# Patient Record
Sex: FEMALE | Race: WHITE | NOT HISPANIC OR LATINO | Employment: FULL TIME | URBAN - METROPOLITAN AREA
[De-identification: names, ages, dates, MRNs, and addresses within clinical notes are randomized per-mention and may not be internally consistent; named-entity substitution may affect disease eponyms.]

---

## 2017-03-17 ENCOUNTER — OFFICE VISIT (OUTPATIENT)
Dept: ORTHOPEDIC SURGERY | Facility: CLINIC | Age: 37
End: 2017-03-17

## 2017-03-17 VITALS — BODY MASS INDEX: 27.46 KG/M2 | WEIGHT: 155 LBS

## 2017-03-17 DIAGNOSIS — M67.911 TENDINOPATHY OF ROTATOR CUFF, RIGHT: ICD-10-CM

## 2017-03-17 DIAGNOSIS — M75.41 SUBACROMIAL IMPINGEMENT, RIGHT: ICD-10-CM

## 2017-03-17 DIAGNOSIS — R52 PAIN: Primary | ICD-10-CM

## 2017-03-17 DIAGNOSIS — S16.1XXA CERVICAL STRAIN, INITIAL ENCOUNTER: ICD-10-CM

## 2017-03-17 PROBLEM — M75.40 SUBACROMIAL IMPINGEMENT: Status: ACTIVE | Noted: 2017-03-17

## 2017-03-17 PROBLEM — M67.919 TENDINOPATHY OF ROTATOR CUFF: Status: ACTIVE | Noted: 2017-03-17

## 2017-03-17 PROCEDURE — 20610 DRAIN/INJ JOINT/BURSA W/O US: CPT | Performed by: NURSE PRACTITIONER

## 2017-03-17 PROCEDURE — 73030 X-RAY EXAM OF SHOULDER: CPT | Performed by: NURSE PRACTITIONER

## 2017-03-17 PROCEDURE — 99213 OFFICE O/P EST LOW 20 MIN: CPT | Performed by: NURSE PRACTITIONER

## 2017-03-17 RX ORDER — TRIAMCINOLONE ACETONIDE 40 MG/ML
80 INJECTION, SUSPENSION INTRA-ARTICULAR; INTRAMUSCULAR
Status: COMPLETED | OUTPATIENT
Start: 2017-03-17 | End: 2017-03-17

## 2017-03-17 RX ORDER — LIDOCAINE HYDROCHLORIDE 10 MG/ML
4 INJECTION, SOLUTION INFILTRATION; PERINEURAL
Status: COMPLETED | OUTPATIENT
Start: 2017-03-17 | End: 2017-03-17

## 2017-03-17 RX ORDER — CYCLOBENZAPRINE HCL 10 MG
10 TABLET ORAL 3 TIMES DAILY PRN
Qty: 30 TABLET | Refills: 0 | Status: SHIPPED | OUTPATIENT
Start: 2017-03-17 | End: 2017-06-12

## 2017-03-17 RX ADMIN — LIDOCAINE HYDROCHLORIDE 4 ML: 10 INJECTION, SOLUTION INFILTRATION; PERINEURAL at 11:04

## 2017-03-17 RX ADMIN — TRIAMCINOLONE ACETONIDE 80 MG: 40 INJECTION, SUSPENSION INTRA-ARTICULAR; INTRAMUSCULAR at 11:04
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
## 2017-08-20 ENCOUNTER — OFFICE VISIT (OUTPATIENT)
Dept: RETAIL CLINIC | Facility: CLINIC | Age: 37
End: 2017-08-20

## 2017-08-20 DIAGNOSIS — Z02.83 ENCOUNTER FOR DRUG SCREENING: Primary | ICD-10-CM

## 2017-08-20 PROBLEM — Z02.1 DRUG SCREENING, PRE-EMPLOYMENT: Status: ACTIVE | Noted: 2017-08-20

## 2017-11-29 ENCOUNTER — OFFICE VISIT (OUTPATIENT)
Dept: ORTHOPEDIC SURGERY | Facility: CLINIC | Age: 37
End: 2017-11-29

## 2017-11-29 DIAGNOSIS — M67.911 TENDINOPATHY OF ROTATOR CUFF, RIGHT: ICD-10-CM

## 2017-11-29 DIAGNOSIS — M54.12 CERVICAL RADICULOPATHY: Primary | ICD-10-CM

## 2017-11-29 DIAGNOSIS — M75.41 SUBACROMIAL IMPINGEMENT, RIGHT: ICD-10-CM

## 2017-11-29 PROCEDURE — 20610 DRAIN/INJ JOINT/BURSA W/O US: CPT | Performed by: NURSE PRACTITIONER

## 2017-11-29 PROCEDURE — 99213 OFFICE O/P EST LOW 20 MIN: CPT | Performed by: NURSE PRACTITIONER

## 2017-11-29 RX ORDER — TOPIRAMATE 50 MG/1
TABLET, FILM COATED ORAL
COMMUNITY
Start: 2017-11-07 | End: 2019-12-07 | Stop reason: ALTCHOICE

## 2017-11-29 RX ORDER — PROMETHAZINE HYDROCHLORIDE 12.5 MG/1
TABLET ORAL
COMMUNITY
Start: 2017-09-05 | End: 2019-12-07 | Stop reason: ALTCHOICE

## 2017-11-29 RX ORDER — SUMATRIPTAN 50 MG/1
TABLET, FILM COATED ORAL
COMMUNITY
Start: 2017-09-08 | End: 2019-12-07 | Stop reason: ALTCHOICE

## 2017-11-29 RX ORDER — ESTRADIOL 0.1 MG/G
CREAM VAGINAL
COMMUNITY
Start: 2017-09-02 | End: 2019-12-07 | Stop reason: ALTCHOICE

## 2017-11-29 RX ORDER — LEVOTHYROXINE SODIUM 0.03 MG/1
TABLET ORAL
COMMUNITY
Start: 2017-11-07

## 2017-11-29 RX ADMIN — TRIAMCINOLONE ACETONIDE 80 MG: 40 INJECTION, SUSPENSION INTRA-ARTICULAR; INTRAMUSCULAR at 15:00

## 2017-11-29 RX ADMIN — BUPIVACAINE HYDROCHLORIDE 1 ML: 5 INJECTION, SOLUTION EPIDURAL; INTRACAUDAL at 15:00

## 2017-11-29 RX ADMIN — LIDOCAINE HYDROCHLORIDE 2 ML: 10 INJECTION, SOLUTION EPIDURAL; INFILTRATION; INTRACAUDAL; PERINEURAL at 15:00

## 2017-12-04 RX ORDER — LIDOCAINE HYDROCHLORIDE 10 MG/ML
2 INJECTION, SOLUTION EPIDURAL; INFILTRATION; INTRACAUDAL; PERINEURAL
Status: COMPLETED | OUTPATIENT
Start: 2017-11-29 | End: 2017-11-29

## 2017-12-04 RX ORDER — BUPIVACAINE HYDROCHLORIDE 5 MG/ML
1 INJECTION, SOLUTION EPIDURAL; INTRACAUDAL
Status: COMPLETED | OUTPATIENT
Start: 2017-11-29 | End: 2017-11-29

## 2017-12-04 RX ORDER — TRIAMCINOLONE ACETONIDE 40 MG/ML
80 INJECTION, SUSPENSION INTRA-ARTICULAR; INTRAMUSCULAR
Status: COMPLETED | OUTPATIENT
Start: 2017-11-29 | End: 2017-11-29

## 2017-12-12 ENCOUNTER — TELEPHONE (OUTPATIENT)
Dept: ORTHOPEDIC SURGERY | Facility: CLINIC | Age: 37
End: 2017-12-12

## 2017-12-12 NOTE — TELEPHONE ENCOUNTER
"Patients request for MRI c spine has been denied by [facility] as \"not medically necessary\". THey gave a list of reason it could be; my best guess would be that the time between when shes was seen in November and again in March was too far apart. They want clinical data over 6 weeks, w/in the last 3 months and she hadn't been seen in over 6 months. I guess what we can do is see her back in 6 weeks from her 11/29 visit and re-evaluate and request the MRI again at that time?  "

## 2017-12-13 DIAGNOSIS — M54.12 CERVICAL RADICULOPATHY: Primary | ICD-10-CM

## 2017-12-13 NOTE — TELEPHONE ENCOUNTER
Yes but I would rather refer on to spine speciality to be evaluated. I will place order for [facility] spine per patient request. Thank you

## 2019-12-07 ENCOUNTER — HOSPITAL ENCOUNTER (EMERGENCY)
Facility: HOSPITAL | Age: 39
Discharge: HOME | End: 2019-12-07
Attending: EMERGENCY MEDICINE | Admitting: EMERGENCY MEDICINE

## 2019-12-07 ENCOUNTER — APPOINTMENT (OUTPATIENT)
Dept: CT IMAGING | Facility: HOSPITAL | Age: 39
End: 2019-12-07

## 2019-12-07 VITALS
RESPIRATION RATE: 16 BRPM | SYSTOLIC BLOOD PRESSURE: 127 MMHG | TEMPERATURE: 98.1 F | DIASTOLIC BLOOD PRESSURE: 76 MMHG | HEART RATE: 72 BPM | WEIGHT: 169.44 LBS | OXYGEN SATURATION: 98 % | HEIGHT: 63 IN | BODY MASS INDEX: 30.02 KG/M2

## 2019-12-07 DIAGNOSIS — N83.202 CYST OF LEFT OVARY: Primary | ICD-10-CM

## 2019-12-07 DIAGNOSIS — R10.30 LOWER ABDOMINAL PAIN: ICD-10-CM

## 2019-12-07 LAB
ALBUMIN SERPL-MCNC: 4.5 G/DL (ref 3.5–5.2)
ALBUMIN/GLOB SERPL: 1.5 G/DL
ALP SERPL-CCNC: 92 U/L (ref 39–117)
ALT SERPL W P-5'-P-CCNC: 22 U/L (ref 1–33)
ANION GAP SERPL CALCULATED.3IONS-SCNC: 16 MMOL/L (ref 5–15)
AST SERPL-CCNC: 17 U/L (ref 1–32)
BACTERIA UR QL AUTO: ABNORMAL /HPF
BASOPHILS # BLD AUTO: 0.06 10*3/MM3 (ref 0–0.2)
BASOPHILS NFR BLD AUTO: 0.6 % (ref 0–1.5)
BILIRUB SERPL-MCNC: 0.2 MG/DL (ref 0.2–1.2)
BILIRUB UR QL STRIP: NEGATIVE
BUN BLD-MCNC: 12 MG/DL (ref 6–20)
BUN/CREAT SERPL: 16.4 (ref 7–25)
CALCIUM SPEC-SCNC: 9.1 MG/DL (ref 8.6–10.5)
CHLORIDE SERPL-SCNC: 101 MMOL/L (ref 98–107)
CLARITY UR: CLEAR
CO2 SERPL-SCNC: 19 MMOL/L (ref 22–29)
COLOR UR: YELLOW
CREAT BLD-MCNC: 0.73 MG/DL (ref 0.57–1)
DEPRECATED RDW RBC AUTO: 38.5 FL (ref 37–54)
EOSINOPHIL # BLD AUTO: 0.26 10*3/MM3 (ref 0–0.4)
EOSINOPHIL NFR BLD AUTO: 2.6 % (ref 0.3–6.2)
ERYTHROCYTE [DISTWIDTH] IN BLOOD BY AUTOMATED COUNT: 11.8 % (ref 12.3–15.4)
GFR SERPL CREATININE-BSD FRML MDRD: 89 ML/MIN/1.73
GLOBULIN UR ELPH-MCNC: 3.1 GM/DL
GLUCOSE BLD-MCNC: 108 MG/DL (ref 65–99)
GLUCOSE UR STRIP-MCNC: NEGATIVE MG/DL
HCT VFR BLD AUTO: 42 % (ref 34–46.6)
HGB BLD-MCNC: 14.4 G/DL (ref 12–15.9)
HGB UR QL STRIP.AUTO: ABNORMAL
HYALINE CASTS UR QL AUTO: ABNORMAL /LPF
IMM GRANULOCYTES # BLD AUTO: 0.06 10*3/MM3 (ref 0–0.05)
IMM GRANULOCYTES NFR BLD AUTO: 0.6 % (ref 0–0.5)
KETONES UR QL STRIP: NEGATIVE
LEUKOCYTE ESTERASE UR QL STRIP.AUTO: NEGATIVE
LYMPHOCYTES # BLD AUTO: 2.29 10*3/MM3 (ref 0.7–3.1)
LYMPHOCYTES NFR BLD AUTO: 22.7 % (ref 19.6–45.3)
MCH RBC QN AUTO: 31 PG (ref 26.6–33)
MCHC RBC AUTO-ENTMCNC: 34.3 G/DL (ref 31.5–35.7)
MCV RBC AUTO: 90.5 FL (ref 79–97)
MONOCYTES # BLD AUTO: 0.66 10*3/MM3 (ref 0.1–0.9)
MONOCYTES NFR BLD AUTO: 6.5 % (ref 5–12)
NEUTROPHILS # BLD AUTO: 6.75 10*3/MM3 (ref 1.7–7)
NEUTROPHILS NFR BLD AUTO: 67 % (ref 42.7–76)
NITRITE UR QL STRIP: NEGATIVE
NRBC BLD AUTO-RTO: 0 /100 WBC (ref 0–0.2)
PH UR STRIP.AUTO: 6.5 [PH] (ref 4.5–8)
PLATELET # BLD AUTO: 257 10*3/MM3 (ref 140–450)
PMV BLD AUTO: 9.9 FL (ref 6–12)
POTASSIUM BLD-SCNC: 4.4 MMOL/L (ref 3.5–5.2)
PROT SERPL-MCNC: 7.6 G/DL (ref 6–8.5)
PROT UR QL STRIP: NEGATIVE
RBC # BLD AUTO: 4.64 10*6/MM3 (ref 3.77–5.28)
RBC # UR: ABNORMAL /HPF
REF LAB TEST METHOD: ABNORMAL
SODIUM BLD-SCNC: 136 MMOL/L (ref 136–145)
SP GR UR STRIP: 1.01 (ref 1–1.03)
SQUAMOUS #/AREA URNS HPF: ABNORMAL /HPF
UROBILINOGEN UR QL STRIP: ABNORMAL
WBC NRBC COR # BLD: 10.08 10*3/MM3 (ref 3.4–10.8)
WBC UR QL AUTO: ABNORMAL /HPF

## 2019-12-07 PROCEDURE — 99282 EMERGENCY DEPT VISIT SF MDM: CPT | Performed by: EMERGENCY MEDICINE

## 2019-12-07 PROCEDURE — 96374 THER/PROPH/DIAG INJ IV PUSH: CPT

## 2019-12-07 PROCEDURE — 99282 EMERGENCY DEPT VISIT SF MDM: CPT

## 2019-12-07 PROCEDURE — 25010000002 KETOROLAC TROMETHAMINE PER 15 MG: Performed by: EMERGENCY MEDICINE

## 2019-12-07 PROCEDURE — 80053 COMPREHEN METABOLIC PANEL: CPT | Performed by: EMERGENCY MEDICINE

## 2019-12-07 PROCEDURE — 81001 URINALYSIS AUTO W/SCOPE: CPT | Performed by: EMERGENCY MEDICINE

## 2019-12-07 PROCEDURE — 74176 CT ABD & PELVIS W/O CONTRAST: CPT

## 2019-12-07 PROCEDURE — 85025 COMPLETE CBC W/AUTO DIFF WBC: CPT | Performed by: EMERGENCY MEDICINE

## 2019-12-07 PROCEDURE — 99283 EMERGENCY DEPT VISIT LOW MDM: CPT

## 2019-12-07 RX ORDER — IBUPROFEN 800 MG/1
800 TABLET ORAL EVERY 8 HOURS PRN
Qty: 15 TABLET | Refills: 0 | OUTPATIENT
Start: 2019-12-07 | End: 2023-03-08

## 2019-12-07 RX ORDER — KETOROLAC TROMETHAMINE 30 MG/ML
15 INJECTION, SOLUTION INTRAMUSCULAR; INTRAVENOUS ONCE
Status: COMPLETED | OUTPATIENT
Start: 2019-12-07 | End: 2019-12-07

## 2019-12-07 RX ORDER — CYCLOBENZAPRINE HCL 10 MG
10 TABLET ORAL 3 TIMES DAILY
Qty: 15 TABLET | Refills: 0 | OUTPATIENT
Start: 2019-12-07 | End: 2023-03-08

## 2019-12-07 RX ADMIN — KETOROLAC TROMETHAMINE 15 MG: 30 INJECTION, SOLUTION INTRAMUSCULAR; INTRAVENOUS at 08:19

## 2019-12-07 NOTE — ED PROVIDER NOTES
Subjective     Flank Pain   Pain location:  L flank  Pain quality: sharp    Pain radiates to:  Does not radiate  Pain severity:  Moderate  Onset quality:  Gradual  Timing:  Constant  Progression:  Unchanged  Chronicity:  New  Context comment:  Spont onset  Relieved by:  Nothing  Worsened by:  Position changes  Associated symptoms: no constipation, no dysuria and no fever        Review of Systems   Constitutional: Negative for fever.   Gastrointestinal: Negative for constipation.   Genitourinary: Positive for flank pain. Negative for dysuria.   All other systems reviewed and are negative.      Past Medical History:   Diagnosis Date   • Anxiety    • Headache    • Pelvic pain in female    • Stress incontinence in female        No Known Allergies    Past Surgical History:   Procedure Laterality Date   • DENTAL PROCEDURE     • DIAGNOSTIC LAPAROSCOPY N/A 8/22/2016    Procedure: EXAM UNDER ANESTHESIA WITH VAGINAL CUFF SUTURE AND CLOSURE OF TVT INCISION;  Surgeon: Jaylyn Amin MD;  Location: [facility];  Service:    • EXPLORATORY LAPAROTOMY      RUPTURED TUBAL PREGNANCY   • HYSTERECTOMY     • OTHER SURGICAL HISTORY      laparoscopic endometriosis   • [place] LAP, RADICAL HYST W/ TUBE&OV, NODE BX N/A 8/9/2016    Procedure: TOTAL LAPAROSCOPIC HYSTERECTOMY, LT SALPINGECTOMY, CYSTOSCOPY;  Surgeon: Jaylyn Amin MD;  Location: [facility];  Service: Gynecology   • TONSILLECTOMY     • TRANSVAGINAL TAPING SUSPENSION N/A 8/9/2016    Procedure: TRANSVAGINAL TAPING SUSPENSION;  Surgeon: Jaylyn Amin MD;  Location: [facility];  Service:    • TUBAL ABDOMINAL LIGATION         Family History   Problem Relation Age of Onset   • Hypertension Mother    • Cancer Maternal Grandmother    • Hypertension Maternal Grandmother    • Diabetes Father        Social History     Socioeconomic History   • Marital status: Single     Spouse name: Not on file   • Number of children: Not on file   • Years of education: Not on file   • Highest  education level: Not on file   Tobacco Use   • Smoking status: Current Every Day Smoker     Packs/day: 1.00     Types: Cigarettes   • Smokeless tobacco: Never Used   Substance and Sexual Activity   • Alcohol use: Yes     Comment: occ   • Drug use: No   • Sexual activity: Defer           Objective   Physical Exam   Constitutional: She appears well-developed and well-nourished.   Abdominal: Soft. Normal appearance. There is no tenderness. There is no rigidity, no rebound, no guarding, no tenderness at McBurney's point and negative Wilson's sign. No hernia.   Neurological: She is alert. She has normal strength. No cranial nerve deficit or sensory deficit. Coordination and gait normal.   Skin: Skin is warm. Capillary refill takes less than 2 seconds. No rash noted.   Psychiatric: She has a normal mood and affect.   Nursing note and vitals reviewed.      Procedures           ED Course      Reeval, appears well, abd soft, ok with plan to f/u gyn                No data recorded                        MDM  Number of Diagnoses or Management Options  Cyst of left ovary:   Lower abdominal pain:      Amount and/or Complexity of Data Reviewed  Clinical lab tests: ordered and reviewed  Tests in the radiology section of CPT®: ordered and reviewed    Risk of Complications, Morbidity, and/or Mortality  Presenting problems: moderate  Diagnostic procedures: moderate  Management options: moderate    Patient Progress  Patient progress: stable      Final diagnoses:   Cyst of left ovary   Lower abdominal pain              Micha Ng MD  12/07/19 0995

## 2023-03-08 ENCOUNTER — HOSPITAL ENCOUNTER (EMERGENCY)
Facility: HOSPITAL | Age: 43
Discharge: HOME | End: 2023-03-08
Attending: EMERGENCY MEDICINE | Admitting: EMERGENCY MEDICINE
Payer: COMMERCIAL

## 2023-03-08 VITALS
DIASTOLIC BLOOD PRESSURE: 70 MMHG | WEIGHT: 165 LBS | HEART RATE: 79 BPM | BODY MASS INDEX: 29.23 KG/M2 | HEIGHT: 63 IN | SYSTOLIC BLOOD PRESSURE: 152 MMHG | RESPIRATION RATE: 18 BRPM | OXYGEN SATURATION: 99 % | TEMPERATURE: 98.1 F

## 2023-03-08 DIAGNOSIS — F41.9 ANXIETY: Primary | ICD-10-CM

## 2023-03-08 PROCEDURE — 99282 EMERGENCY DEPT VISIT SF MDM: CPT

## 2023-03-09 NOTE — ED NOTES
Patient is unsure of what meds she was most recently taking. Nurse showed her a list of what her PCP had listed. She thinks duloxetine, topiramate, and celexa were the ones she was on that were abruptly stopped. Dr. Vargas updated.

## 2023-03-09 NOTE — ED PROVIDER NOTES
Subjective   History of Present Illness  42-year-old female presents complaining of worsening anxiety, headaches, inability to sleep since stopping multiple medications cold turkey about 3 weeks ago.  Per patient, she recently began seeing Dr. Moralez for primary care at which time she found out she had elevated liver enzymes.  He had plan to taper her off of multiple psychiatric medications including Topamax, Celexa, Abilify, and start her on Vraylar.  Instead of tapering off these medication she quit all of them cold turkey and is only been taking her levothyroxine since that time.  She has had issues with pharmacy having Vraylar in stock so she has not yet started this medication but is scheduled to pick it up tomorrow.  She presents tonight with the above complaints.  She denies SI, HI, hallucinations.  She has diagnosis of bipolar listed on her chart.  She cannot tell me specifically what her diagnoses are however she denies any history of psychotic or manic episodes.        Review of Systems   All other systems reviewed and are negative.      Past Medical History:   Diagnosis Date   • Anxiety    • Headache    • Pelvic pain in female    • Stress incontinence in female        No Known Allergies    Past Surgical History:   Procedure Laterality Date   • DENTAL PROCEDURE     • DIAGNOSTIC LAPAROSCOPY N/A 8/22/2016    Procedure: EXAM UNDER ANESTHESIA WITH VAGINAL CUFF SUTURE AND CLOSURE OF TVT INCISION;  Surgeon: Jaylyn Amin MD;  Location: [facility];  Service:    • EXPLORATORY LAPAROTOMY      RUPTURED TUBAL PREGNANCY   • HYSTERECTOMY     • OTHER SURGICAL HISTORY      laparoscopic endometriosis   • [place] LAPS W/RAD HYST W/BILAT LMPHADEC RMVL TUBE/OVARY N/A 8/9/2016    Procedure: TOTAL LAPAROSCOPIC HYSTERECTOMY, LT SALPINGECTOMY, CYSTOSCOPY;  Surgeon: Jaylyn Amin MD;  Location: [facility];  Service: Gynecology   • TONSILLECTOMY     • TRANSVAGINAL TAPING SUSPENSION N/A 8/9/2016    Procedure: TRANSVAGINAL TAPING  SUSPENSION;  Surgeon: Jaylyn Amin MD;  Location: [facility] OR;  Service:    • TUBAL ABDOMINAL LIGATION         Family History   Problem Relation Age of Onset   • Hypertension Mother    • Cancer Maternal Grandmother    • Hypertension Maternal Grandmother    • Diabetes Father        Social History     Socioeconomic History   • Marital status: Single   Tobacco Use   • Smoking status: Every Day     Packs/day: 1.00     Types: Cigarettes   • Smokeless tobacco: Never   Substance and Sexual Activity   • Alcohol use: Yes     Comment: occ   • Drug use: No   • Sexual activity: Defer           Objective   Physical Exam  Constitutional:       General: She is not in acute distress.     Appearance: She is not toxic-appearing.   HENT:      Head: Normocephalic and atraumatic.      Mouth/Throat:      Mouth: Mucous membranes are moist.      Pharynx: Oropharynx is clear.   Eyes:      Extraocular Movements: Extraocular movements intact.      Conjunctiva/sclera: Conjunctivae normal.      Pupils: Pupils are equal, round, and reactive to light.   Cardiovascular:      Rate and Rhythm: Normal rate and regular rhythm.   Pulmonary:      Effort: Pulmonary effort is normal. No respiratory distress.   Musculoskeletal:         General: No deformity or signs of injury.      Cervical back: Normal range of motion and neck supple.   Skin:     General: Skin is warm and dry.   Neurological:      General: No focal deficit present.      Mental Status: She is alert and oriented to person, place, and time. Mental status is at baseline.   Psychiatric:         Mood and Affect: Mood normal.         Behavior: Behavior normal.         Thought Content: Thought content normal.         Judgment: Judgment normal.         Procedures           ED Course  ED Course as of 03/08/23 2352   Wed Mar 08, 2023   2350 At this point patient has no indication for emergent psychiatric intervention or further work-up in the emergency department tonight.  She should long since  be chemically detoxed from the medication she discontinued abruptly and at this point I suspect we are dealing mostly with patient's original underlying diagnoses.  She has been prescribed appropriate medication for this and is scheduled to pick it up tomorrow.  She has primary care follow-up scheduled for next week.  Sounds as if her situation is being appropriately managed as an outpatient and I do not think there is much to gain by me trying to intervene on this plan.  I discussed this at length with patient and family at bedside and ultimately they are in agreement with this plan and comfortable with discharge. [TD]      ED Course User Index  [TD] Orlin Vargas MD                                           [facility]    Final diagnoses:   Anxiety       ED Disposition  ED Disposition     ED Disposition   Discharge    Condition   Stable    Comment   --             Prabhakar Moralez MD  [address] [place] [id number] [phone]    In 1 week  Keep follow up appointment next week         Medication List      Stop    cyclobenzaprine 10 MG tablet  Commonly known as: FLEXERIL     DULoxetine 60 MG capsule  Commonly known as: CYMBALTA     ibuprofen 800 MG tablet  Commonly known as: ADVIL,MOTRIN     multivitamin with minerals tablet tablet     NON FORMULARY     NON FORMULARY             Orlin Vargas MD  03/08/23 7489

## 2023-03-09 NOTE — ED NOTES
Pt has been off her meds for 3 weeks. She was supposed to taper them but quit taking them. Explained that she is likely not withdrawing at this time but the reason she is supposed to be on them is what she is experiencing now. Told her mom to make sure she calls Dr. Moralez tomorrow to let him know of these symptoms and that she has not started the Vraylar, which was prescribed Monday, since the pharmacy has not had it in stock. Mom agrees to do so.

## 2023-03-22 ENCOUNTER — OFFICE VISIT (OUTPATIENT)
Dept: ORTHOPEDIC SURGERY | Facility: CLINIC | Age: 43
End: 2023-03-22
Payer: COMMERCIAL

## 2023-03-22 VITALS
HEART RATE: 83 BPM | SYSTOLIC BLOOD PRESSURE: 113 MMHG | WEIGHT: 179.2 LBS | HEIGHT: 62 IN | BODY MASS INDEX: 32.97 KG/M2 | DIASTOLIC BLOOD PRESSURE: 76 MMHG

## 2023-03-22 DIAGNOSIS — R52 PAIN: Primary | ICD-10-CM

## 2023-03-22 DIAGNOSIS — M75.51 SUBACROMIAL BURSITIS OF RIGHT SHOULDER JOINT: ICD-10-CM

## 2023-03-22 DIAGNOSIS — M75.21 BICEPS TENDINITIS OF RIGHT UPPER EXTREMITY: ICD-10-CM

## 2023-03-22 DIAGNOSIS — S46.811A TRAPEZIUS MUSCLE STRAIN, RIGHT, INITIAL ENCOUNTER: ICD-10-CM

## 2023-03-22 PROCEDURE — 73030 X-RAY EXAM OF SHOULDER: CPT | Performed by: NURSE PRACTITIONER

## 2023-03-22 PROCEDURE — 99204 OFFICE O/P NEW MOD 45 MIN: CPT | Performed by: NURSE PRACTITIONER

## 2023-03-22 RX ORDER — PREDNISONE 10 MG/1
TABLET ORAL
Qty: 27 TABLET | Refills: 0 | Status: SHIPPED | OUTPATIENT
Start: 2023-03-22

## 2023-03-22 RX ORDER — TIZANIDINE 4 MG/1
4 TABLET ORAL NIGHTLY PRN
Qty: 30 TABLET | Refills: 0 | Status: SHIPPED | OUTPATIENT
Start: 2023-03-22

## 2023-03-22 RX ORDER — DICLOFENAC SODIUM 75 MG/1
75 TABLET, DELAYED RELEASE ORAL 2 TIMES DAILY
Qty: 60 TABLET | Refills: 2 | Status: SHIPPED | OUTPATIENT
Start: 2023-03-22

## 2023-03-22 NOTE — PROGRESS NOTES
Subjective:     Patient ID: Roberta Deshpande is a 42 y.o. female.    Chief Complaint:  Right shoulder pain, new patient to examiner  History of Present Illness  Roberta Deshpande 42-year-old female presents to clinic for evaluation of her right upper extremity.  She was seen greater than 3 years ago from the right shoulder denies any recent x-ray, MRI, CT of the shoulder in the past.  Pain present off and on for the last few years localizes pain to the anterior and lateral aspect of the shoulder which does radiate into the neck as well as paresthesia down to the fifth and fourth digit of the right upper extremity.  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  Rates discomfort an 8-9 out of a 10 aching, shooting, stabbing in nature.  She is experiencing a clicking popping sensation across the anterior aspect of the shoulder.  Working makes symptoms worse but is experiencing pain also with sleeping.  She is experiencing paresthesia symptoms down the scapular spine as well.  Has tried Aleve, ibuprofen without any significant symptom relief currently applying heat.  Denies any other concerns present.    Social History     Occupational History   • Not on file   Tobacco Use   • Smoking status: Every Day     Packs/day: 1.00     Types: Cigarettes   • Smokeless tobacco: Never   Substance and Sexual Activity   • Alcohol use: Not Currently     Comment: occ   • Drug use: No   • Sexual activity: Defer      Past Medical History:   Diagnosis Date   • Anxiety    • Headache    • Pelvic pain in female    • Stress incontinence in female      Past Surgical History:   Procedure Laterality Date   • DENTAL PROCEDURE     • DIAGNOSTIC LAPAROSCOPY N/A 8/22/2016    Procedure: EXAM UNDER ANESTHESIA WITH VAGINAL CUFF SUTURE AND CLOSURE OF TVT INCISION;  Surgeon: Jaylyn Amin MD;  Location: [facility];  Service:    • EXPLORATORY LAPAROTOMY      RUPTURED  "TUBAL PREGNANCY   • HYSTERECTOMY     • OTHER SURGICAL HISTORY      laparoscopic endometriosis   • [place] LAPS W/RAD HYST W/BILAT LMPHADEC RMVL TUBE/OVARY N/A 8/9/2016    Procedure: TOTAL LAPAROSCOPIC HYSTERECTOMY, LT SALPINGECTOMY, CYSTOSCOPY;  Surgeon: Jaylyn Amin MD;  Location: [facility];  Service: Gynecology   • TONSILLECTOMY     • TRANSVAGINAL TAPING SUSPENSION N/A 8/9/2016    Procedure: TRANSVAGINAL TAPING SUSPENSION;  Surgeon: Jaylyn Amin MD;  Location: [facility];  Service:    • TUBAL ABDOMINAL LIGATION         Family History   Problem Relation Age of Onset   • Hypertension Mother    • Cancer Maternal Grandmother    • Hypertension Maternal Grandmother    • Diabetes Father                Objective:  Physical Exam    Vital signs reviewed.   General: No acute distress.  Eyes: conjunctiva clear; pupils equally round and reactive  ENT: external ears and nose atraumatic; oropharynx clear  CV: no peripheral edema  Resp: normal respiratory effort  Skin: no rashes or wounds; normal turgor  Psych: mood and affect appropriate; recent and remote memory intact    Vitals:    03/22/23 0941   BP: 113/76   Pulse: 83   Weight: 81.3 kg (179 lb 3.2 oz)   Height: 157.5 cm (62\")         03/22/23  0941   Weight: 81.3 kg (179 lb 3.2 oz)     Body mass index is 32.78 kg/m².      Back Exam     Tenderness   The patient is experiencing tenderness in the cervical.    Comments:  Positive paraspinal tenderness bilaterally      Right Elbow Exam     Range of Motion   Extension: 0   Flexion: 120     Tests   Tinel's sign (cubital tunnel): positive    Other   Erythema: absent  Sensation: normal  Pulse: present      Right Shoulder Exam     Tenderness   The patient is experiencing tenderness in the acromion and biceps tendon.    Range of Motion   External rotation: 70   Forward flexion: 170   Internal rotation 0 degrees: L1     Tests   Kidd test: positive  Impingement: positive  Drop arm: negative    Other   Erythema: " absent  Sensation: normal  Pulse: present    Comments:  Internal/external rotation strength 4+ out of 5  Supraspinatus strength 4+ out of 5  Subscapularis strength 4+5  Bicep strength 4+ out of 5  Negative Hornblower's exam  negative empty can  negative Butts's  positive Speed's  negative bear hug exam               Imaging:  Right Shoulder X-Ray  Indication: Pain  AP Internal and External Rotation views    Findings:  No fracture  No bony lesion  Normal soft tissues  Normal joint spaces    prior studies were available for comparison.    Assessment:        1. Pain    2. Biceps tendinitis of right upper extremity    3. Subacromial bursitis of right shoulder joint    4. Trapezius muscle strain, right, initial encounter           Plan:  1. Discussed plan of care with patient.  Discontinue over-the-counter anti-inflammatory we will start prednisone taper as well as diclofenac after completion of the prednisone taper.  We did review and discuss exercises for her to start for the shoulder as well as the neck.  I do recommend continue with application of heat of the neck.  Plan to see her back in clinic in 4 weeks to reevaluate.  We will start Zanaflex at night as needed may cut dosage in half to decrease the drowsiness affect.  Encouraged to call with any question concerns she has between now and follow-up.  All questions answered.  Orders:  Orders Placed This Encounter   Procedures   • XR Shoulder 2+ View Right     New Medications Ordered This Visit   Medications   • diclofenac (VOLTAREN) 75 MG EC tablet     Sig: Take 1 tablet by mouth 2 (Two) Times a Day.     Dispense:  60 tablet     Refill:  2   • predniSONE (DELTASONE) 10 MG tablet     Si tabs daily x 3 days, 3 tabs daily x 3 days then 1 tab daily x 3 days     Dispense:  27 tablet     Refill:  0   • tiZANidine (ZANAFLEX) 4 MG tablet     Sig: Take 1 tablet by mouth At Night As Needed for Muscle Spasms.     Dispense:  30 tablet     Refill:  0           I ordered  and reviewed the STALIN today.       Dragon dictation utilized  We encourage all our patients to maintain a healthy weight - a Body Mass Index of 20-25. This, along with regular exercise and a balanced diet can lower your risk of many illnesses such as diabetes, heart disease, and stroke.

## 2024-10-30 ENCOUNTER — APPOINTMENT (OUTPATIENT)
Dept: GENERAL RADIOLOGY | Facility: HOSPITAL | Age: 44
End: 2024-10-30
Payer: COMMERCIAL

## 2024-10-30 ENCOUNTER — HOSPITAL ENCOUNTER (EMERGENCY)
Facility: HOSPITAL | Age: 44
Discharge: HOME | End: 2024-10-30
Attending: EMERGENCY MEDICINE | Admitting: EMERGENCY MEDICINE
Payer: COMMERCIAL

## 2024-10-30 VITALS
BODY MASS INDEX: 33.27 KG/M2 | OXYGEN SATURATION: 99 % | DIASTOLIC BLOOD PRESSURE: 109 MMHG | WEIGHT: 180.8 LBS | TEMPERATURE: 98.5 F | SYSTOLIC BLOOD PRESSURE: 131 MMHG | HEIGHT: 62 IN | HEART RATE: 75 BPM | RESPIRATION RATE: 16 BRPM

## 2024-10-30 DIAGNOSIS — R07.89 MUSCULOSKELETAL CHEST PAIN: Primary | ICD-10-CM

## 2024-10-30 LAB
ALBUMIN SERPL-MCNC: 5.1 G/DL (ref 3.5–5.2)
ALBUMIN/GLOB SERPL: 1.8 G/DL
ALP SERPL-CCNC: 67 U/L (ref 39–117)
ALT SERPL W P-5'-P-CCNC: 55 U/L (ref 1–33)
ANION GAP SERPL CALCULATED.3IONS-SCNC: 12 MMOL/L (ref 5–15)
AST SERPL-CCNC: 40 U/L (ref 1–32)
BASOPHILS # BLD AUTO: 0.14 10*3/MM3 (ref 0–0.2)
BASOPHILS NFR BLD AUTO: 1.4 % (ref 0–1.5)
BILIRUB SERPL-MCNC: 0.4 MG/DL (ref 0–1.2)
BUN SERPL-MCNC: 11 MG/DL (ref 6–20)
BUN/CREAT SERPL: 13.3 (ref 7–25)
CALCIUM SPEC-SCNC: 10 MG/DL (ref 8.6–10.5)
CHLORIDE SERPL-SCNC: 102 MMOL/L (ref 98–107)
CO2 SERPL-SCNC: 25 MMOL/L (ref 22–29)
CREAT SERPL-MCNC: 0.83 MG/DL (ref 0.57–1)
D DIMER PPP FEU-MCNC: <0.27 MCGFEU/ML (ref 0–0.5)
DEPRECATED RDW RBC AUTO: 42.5 FL (ref 37–54)
EGFRCR SERPLBLD CKD-EPI 2021: 89.3 ML/MIN/1.73
EOSINOPHIL # BLD AUTO: 0.35 10*3/MM3 (ref 0–0.4)
EOSINOPHIL NFR BLD AUTO: 3.5 % (ref 0.3–6.2)
ERYTHROCYTE [DISTWIDTH] IN BLOOD BY AUTOMATED COUNT: 13.2 % (ref 12.3–15.4)
GEN 5 2HR TROPONIN T REFLEX: <6 NG/L
GLOBULIN UR ELPH-MCNC: 2.8 GM/DL
GLUCOSE SERPL-MCNC: 94 MG/DL (ref 65–99)
HCT VFR BLD AUTO: 44.7 % (ref 34–46.6)
HGB BLD-MCNC: 15.5 G/DL (ref 12–15.9)
HOLD SPECIMEN: NORMAL
HOLD SPECIMEN: NORMAL
IMM GRANULOCYTES # BLD AUTO: 0.22 10*3/MM3 (ref 0–0.05)
IMM GRANULOCYTES NFR BLD AUTO: 2.2 % (ref 0–0.5)
LYMPHOCYTES # BLD AUTO: 2.51 10*3/MM3 (ref 0.7–3.1)
LYMPHOCYTES NFR BLD AUTO: 25.3 % (ref 19.6–45.3)
MCH RBC QN AUTO: 31.1 PG (ref 26.6–33)
MCHC RBC AUTO-ENTMCNC: 34.7 G/DL (ref 31.5–35.7)
MCV RBC AUTO: 89.8 FL (ref 79–97)
MONOCYTES # BLD AUTO: 0.36 10*3/MM3 (ref 0.1–0.9)
MONOCYTES NFR BLD AUTO: 3.6 % (ref 5–12)
NEUTROPHILS NFR BLD AUTO: 6.34 10*3/MM3 (ref 1.7–7)
NEUTROPHILS NFR BLD AUTO: 64 % (ref 42.7–76)
NRBC BLD AUTO-RTO: 0 /100 WBC (ref 0–0.2)
NT-PROBNP SERPL-MCNC: <36 PG/ML (ref 0–450)
PLAT MORPH BLD: NORMAL
PLATELET # BLD AUTO: 224 10*3/MM3 (ref 140–450)
PMV BLD AUTO: 11.6 FL (ref 6–12)
POTASSIUM SERPL-SCNC: 4.1 MMOL/L (ref 3.5–5.2)
PROT SERPL-MCNC: 7.9 G/DL (ref 6–8.5)
QT INTERVAL: 367 MS
QTC INTERVAL: 400 MS
RBC # BLD AUTO: 4.98 10*6/MM3 (ref 3.77–5.28)
RBC MORPH BLD: NORMAL
SODIUM SERPL-SCNC: 139 MMOL/L (ref 136–145)
TROPONIN T DELTA: NORMAL
TROPONIN T SERPL HS-MCNC: <6 NG/L
WBC MORPH BLD: NORMAL
WBC NRBC COR # BLD AUTO: 9.92 10*3/MM3 (ref 3.4–10.8)
WHOLE BLOOD HOLD COAG: NORMAL
WHOLE BLOOD HOLD SPECIMEN: NORMAL

## 2024-10-30 PROCEDURE — 25010000002 KETOROLAC TROMETHAMINE PER 15 MG: Performed by: EMERGENCY MEDICINE

## 2024-10-30 PROCEDURE — 85007 BL SMEAR W/DIFF WBC COUNT: CPT | Performed by: EMERGENCY MEDICINE

## 2024-10-30 PROCEDURE — 96374 THER/PROPH/DIAG INJ IV PUSH: CPT

## 2024-10-30 PROCEDURE — 93005 ELECTROCARDIOGRAM TRACING: CPT | Performed by: EMERGENCY MEDICINE

## 2024-10-30 PROCEDURE — 99284 EMERGENCY DEPT VISIT MOD MDM: CPT | Performed by: EMERGENCY MEDICINE

## 2024-10-30 PROCEDURE — 85025 COMPLETE CBC W/AUTO DIFF WBC: CPT | Performed by: EMERGENCY MEDICINE

## 2024-10-30 PROCEDURE — 93010 ELECTROCARDIOGRAM REPORT: CPT | Performed by: INTERNAL MEDICINE

## 2024-10-30 PROCEDURE — 80053 COMPREHEN METABOLIC PANEL: CPT | Performed by: EMERGENCY MEDICINE

## 2024-10-30 PROCEDURE — 85379 FIBRIN DEGRADATION QUANT: CPT | Performed by: EMERGENCY MEDICINE

## 2024-10-30 PROCEDURE — 71046 X-RAY EXAM CHEST 2 VIEWS: CPT

## 2024-10-30 PROCEDURE — 84484 ASSAY OF TROPONIN QUANT: CPT | Performed by: EMERGENCY MEDICINE

## 2024-10-30 PROCEDURE — 83880 ASSAY OF NATRIURETIC PEPTIDE: CPT | Performed by: EMERGENCY MEDICINE

## 2024-10-30 PROCEDURE — 36415 COLL VENOUS BLD VENIPUNCTURE: CPT

## 2024-10-30 RX ORDER — SODIUM CHLORIDE 0.9 % (FLUSH) 0.9 %
10 SYRINGE (ML) INJECTION AS NEEDED
Status: DISCONTINUED | OUTPATIENT
Start: 2024-10-30 | End: 2024-10-30 | Stop reason: HOSPADM

## 2024-10-30 RX ORDER — KETOROLAC TROMETHAMINE 10 MG/1
10 TABLET, FILM COATED ORAL EVERY 6 HOURS PRN
Qty: 20 TABLET | Refills: 0 | Status: SHIPPED | OUTPATIENT
Start: 2024-10-30

## 2024-10-30 RX ORDER — KETOROLAC TROMETHAMINE 30 MG/ML
15 INJECTION, SOLUTION INTRAMUSCULAR; INTRAVENOUS ONCE
Status: COMPLETED | OUTPATIENT
Start: 2024-10-30 | End: 2024-10-30

## 2024-10-30 RX ADMIN — KETOROLAC TROMETHAMINE 15 MG: 30 INJECTION, SOLUTION INTRAMUSCULAR; INTRAVENOUS at 12:53

## 2024-10-30 NOTE — ED PROVIDER NOTES
Subjective   History of Present Illness    Chief complaint: Chest pain    Location: Anterior chest, radiating to the right shoulder    Quality/Severity: Pressure, 9/10 at its worst, 7/10 currently    Timing/Onset/Duration: Started 3 days ago, constant    Modifying Factors: Patient took Tums without relief.  It hurts to move.    Associated Symptoms: No headache.  No fever chills or cough.  No sore throat earache or nasal congestion.  No shortness of breath.  No nausea or vomiting.  Patient did get a little diaphoretic.  The diaphoresis occurred last night.  The patient does relate that she had a nosebleed the first night of her chest pain.    Narrative: This 44-year-old white female presents with chest pain that radiates to left shoulder blade.  This has been present for the last 3 days.  The patient denies any trauma.  The patient smokes.  She is not diabetic.  She has never had an MI.  There is no family history of coronary artery disease.  The patient does have elevated cholesterol.  The patient has never had a stress test or cardiac catheterization.  The patient's never had a blood clot in her leg or lung.  No recent long trips or surgeries.  No bleeding or clotting problems.  The patient does not have cancer and is not on hormone therapy.    PCP:Prabhakar Moralez MD      Review of Systems   Constitutional:  Positive for diaphoresis. Negative for chills and fever.   HENT:  Positive for nosebleeds. Negative for congestion, ear pain and sore throat.    Respiratory:  Negative for shortness of breath.    Cardiovascular:  Positive for chest pain. Negative for palpitations and leg swelling.   Gastrointestinal:  Negative for abdominal pain, diarrhea, nausea and vomiting.   Genitourinary:  Negative for dysuria.   Musculoskeletal:  Negative for back pain.   Skin:  Negative for rash.   Neurological:  Negative for weakness, numbness and headaches.         Past Medical History:   Diagnosis Date    Anxiety     Headache      Pelvic pain in female     Stress incontinence in female        No Known Allergies    Past Surgical History:   Procedure Laterality Date    DENTAL PROCEDURE      DIAGNOSTIC LAPAROSCOPY N/A 8/22/2016    Procedure: EXAM UNDER ANESTHESIA WITH VAGINAL CUFF SUTURE AND CLOSURE OF TVT INCISION;  Surgeon: Jaylyn Amin MD;  Location: [facility];  Service:     EXPLORATORY LAPAROTOMY      RUPTURED TUBAL PREGNANCY    HYSTERECTOMY      OTHER SURGICAL HISTORY      laparoscopic endometriosis    WV LAPS W/RAD HYST W/BILAT LMPHADEC RMVL TUBE/OVARY N/A 8/9/2016    Procedure: TOTAL LAPAROSCOPIC HYSTERECTOMY, LT SALPINGECTOMY, CYSTOSCOPY;  Surgeon: Jaylyn Amin MD;  Location: [facility] OR;  Service: Gynecology    TONSILLECTOMY      TRANSVAGINAL TAPING SUSPENSION N/A 8/9/2016    Procedure: TRANSVAGINAL TAPING SUSPENSION;  Surgeon: Jaylyn Amin MD;  Location: [facility];  Service:     TUBAL ABDOMINAL LIGATION         Family History   Problem Relation Age of Onset    Hypertension Mother     Cancer Maternal Grandmother     Hypertension Maternal Grandmother     Diabetes Father        Social History     Socioeconomic History    Marital status: Single   Tobacco Use    Smoking status: Every Day     Current packs/day: 1.00     Types: Cigarettes    Smokeless tobacco: Never   Vaping Use    Vaping status: Never Used   Substance and Sexual Activity    Alcohol use: Not Currently     Comment: occ    Drug use: No    Sexual activity: Defer           Objective   Physical Exam  Vitals (The blood pressure is 141/82.  The temperature is 98.5 °F, pulse 74, respirations 17, room air pulse ox 97%) and nursing note reviewed.   Constitutional:       Appearance: She is well-developed.   HENT:      Head: Normocephalic and atraumatic.   Neck:      Vascular: No JVD.   Cardiovascular:      Rate and Rhythm: Normal rate and regular rhythm.      Heart sounds: No murmur heard.     No friction rub. No gallop.   Pulmonary:      Effort: Pulmonary effort is  normal.      Breath sounds: Normal breath sounds.   Chest:      Chest wall: No tenderness.   Abdominal:      General: Bowel sounds are normal.      Palpations: Abdomen is soft. There is no mass.      Tenderness: There is no abdominal tenderness. There is no guarding or rebound.   Musculoskeletal:      Cervical back: Normal range of motion and neck supple.      Right lower leg: No edema.      Left lower leg: No edema.   Skin:     General: Skin is warm and dry.   Neurological:      General: No focal deficit present.      Mental Status: She is alert and oriented to person, place, and time.         Procedures           ED Course  ED Course as of 10/30/24 1507   Wed Oct 30, 2024   1306 The CBC is unremarkable. [RC]   1307 The proBNP is less than 36 and normal [RC]   1307 The high-sensitivity troponin is less than 6 and normal [RC]   1307 The D-dimer is less than 0.27 and normal. [RC]   1307 The ALT is 55, and AST is 40.  The CMP is otherwise unremarkable. [RC]   1307 Compared to 3 years ago, the AST is mildly increased.  3 years ago the AST was 29. [RC]   1507 The 2-hour troponin is less than 6 with a delta T of 0. [RC]      ED Course User Index  [RC] Valentino Herrera MD      12:21 EDT, 10/30/24:  The EKG was obtained at 1212 and read by me at 1213.  The EKG shows a normal sinus rhythm with rate of 71.  There is a normal axis with no hypertrophy.  The ID, QRS, and QT intervals are unremarkable.  There is no ectopy.  There is poor anterior R wave progression.  There is no acute ST elevation or depression.  The EKG today compared to EKG dated 8/22/2016 is essentially unchanged.    15:09 EDT, 10/30/24:  The patient was reassessed.  She feels better.  Her vital signs were reviewed and are stable.  The patient's workup is not concerning for acute coronary ischemia, the D-dimer is normal.  The chest x-ray is unremarkable.  The blood work is otherwise unremarkable.  The patient's symptoms seem most consistent with  musculoskeletal etiology.  Her pain is improved with the ketorolac.  I will prescribe her ketorolac.  The patient has Zanaflex at home.  The patient should call the patient connection line in the morning for an appointment with local cardiology within 1 week.  The patient should follow-up with Dr. Moralez in 1-2 weeks.  She should return to the Emergency Department there is worsening pain, shortness of breath, worsening way at all.  All the patient question were answered she will be discharged in good condition.                                         Medical Decision Making      Final diagnoses:   Musculoskeletal chest pain       ED Disposition  ED Disposition       None            No follow-up provider specified.       Medication List      No changes were made to your prescriptions during this visit.       XR Chest 2 View    (Results Pending)     Labs Reviewed   RAINBOW DRAW    Narrative:     The following orders were created for panel order [place] Draw.  Procedure                               Abnormality         Status                     ---------                               -----------         ------                     Green Top (Gel)[[id number]]                                                             Lavender Top[[id number]]                                                                Gold Top - SST[[id number]]                                                              Light Blue Top[[id number]]                                                                Please view results for these tests on the individual orders.   COMPREHENSIVE METABOLIC PANEL   TROPONIN   CBC WITH AUTO DIFFERENTIAL   CBC AND DIFFERENTIAL    Narrative:     The following orders were created for panel order CBC & Differential.  Procedure                               Abnormality         Status                     ---------                               -----------         ------                     CBC Auto Differential[[id number]]                                                          Please view results for these tests on the individual orders.   GREEN TOP   LAVENDER TOP   GOLD TOP - SST   LIGHT BLUE TOP     No results found.    .endnote       Valentino Herrera MD  10/30/24 [id number]

## 2024-10-30 NOTE — DISCHARGE INSTRUCTIONS
Take the ketorolac as needed as directed for pain.  Follow-up with Dr. Moralez within 1 to 2 weeks.  Call the patient connection line in the morning for an appointment with [place] cardiology within 1 week.  Return to the emergency part with there is worsening pain, shortness of breath, worse in any way at all.  Stop smoking.

## 2025-05-14 ENCOUNTER — TRANSCRIBE ORDERS (OUTPATIENT)
Dept: ADMINISTRATIVE | Facility: HOSPITAL | Age: 45
End: 2025-05-14
Payer: COMMERCIAL

## 2025-05-14 DIAGNOSIS — N63.11 MASS OF UPPER OUTER QUADRANT OF RIGHT BREAST: Primary | ICD-10-CM

## 2025-07-31 ENCOUNTER — HOSPITAL ENCOUNTER (OUTPATIENT)
Dept: MAMMOGRAPHY | Facility: HOSPITAL | Age: 45
Discharge: HOME | End: 2025-07-31
Payer: COMMERCIAL

## 2025-07-31 ENCOUNTER — HOSPITAL ENCOUNTER (OUTPATIENT)
Dept: ULTRASOUND IMAGING | Facility: HOSPITAL | Age: 45
Discharge: HOME | End: 2025-07-31
Payer: COMMERCIAL

## 2025-07-31 DIAGNOSIS — N63.11 MASS OF UPPER OUTER QUADRANT OF RIGHT BREAST: ICD-10-CM

## 2025-07-31 PROCEDURE — 76641 ULTRASOUND BREAST COMPLETE: CPT | Performed by: RADIOLOGY

## 2025-07-31 PROCEDURE — 77066 DX MAMMO INCL CAD BI: CPT | Performed by: RADIOLOGY

## 2025-07-31 PROCEDURE — 76641 ULTRASOUND BREAST COMPLETE: CPT

## 2025-07-31 PROCEDURE — 77062 BREAST TOMOSYNTHESIS BI: CPT | Performed by: RADIOLOGY

## 2025-07-31 PROCEDURE — 77066 DX MAMMO INCL CAD BI: CPT

## 2025-07-31 PROCEDURE — G0279 TOMOSYNTHESIS, MAMMO: HCPCS

## 2025-08-26 ENCOUNTER — HOSPITAL ENCOUNTER (OUTPATIENT)
Dept: MAMMOGRAPHY | Facility: HOSPITAL | Age: 45
Discharge: HOME | End: 2025-08-26
Payer: COMMERCIAL

## 2025-08-26 ENCOUNTER — HOSPITAL ENCOUNTER (OUTPATIENT)
Dept: ULTRASOUND IMAGING | Facility: HOSPITAL | Age: 45
Discharge: HOME | End: 2025-08-26
Payer: COMMERCIAL

## 2025-08-26 DIAGNOSIS — R59.9 LYMPH NODE ENLARGEMENT: ICD-10-CM

## 2025-08-26 PROCEDURE — 25010000002 LIDOCAINE 1% - EPINEPHRINE 1:100000 1 %-1:100000 SOLUTION: Performed by: FAMILY MEDICINE

## 2025-08-26 PROCEDURE — A4648 IMPLANTABLE TISSUE MARKER: HCPCS

## 2025-08-26 PROCEDURE — 88342 IMHCHEM/IMCYTCHM 1ST ANTB: CPT | Performed by: FAMILY MEDICINE

## 2025-08-26 PROCEDURE — C1894 INTRO/SHEATH, NON-LASER: HCPCS

## 2025-08-26 PROCEDURE — 25010000002 LIDOCAINE 1 % SOLUTION: Performed by: FAMILY MEDICINE

## 2025-08-26 PROCEDURE — 88305 TISSUE EXAM BY PATHOLOGIST: CPT | Performed by: FAMILY MEDICINE

## 2025-08-26 PROCEDURE — 88312 SPECIAL STAINS GROUP 1: CPT | Performed by: FAMILY MEDICINE

## 2025-08-26 PROCEDURE — 88341 IMHCHEM/IMCYTCHM EA ADD ANTB: CPT | Performed by: FAMILY MEDICINE

## 2025-08-26 RX ORDER — LIDOCAINE HYDROCHLORIDE 10 MG/ML
5 INJECTION, SOLUTION INFILTRATION; PERINEURAL ONCE
Status: COMPLETED | OUTPATIENT
Start: 2025-08-26 | End: 2025-08-26

## 2025-08-26 RX ORDER — LIDOCAINE HYDROCHLORIDE AND EPINEPHRINE 10; 10 MG/ML; UG/ML
10 INJECTION, SOLUTION INFILTRATION; PERINEURAL ONCE
Status: COMPLETED | OUTPATIENT
Start: 2025-08-26 | End: 2025-08-26

## 2025-08-26 RX ADMIN — LIDOCAINE HYDROCHLORIDE 5 ML: 10 INJECTION, SOLUTION INFILTRATION; PERINEURAL at 09:20

## 2025-08-26 RX ADMIN — LIDOCAINE HYDROCHLORIDE AND EPINEPHRINE 10 ML: 10; 10 INJECTION, SOLUTION INFILTRATION; PERINEURAL at 09:20

## 2025-08-29 LAB
CYTO UR: NORMAL
DX PRELIMINARY: NORMAL
LAB AP CASE REPORT: NORMAL
LAB AP CLINICAL INFORMATION: NORMAL
LAB AP SPECIAL STAINS: NORMAL
PATH REPORT.FINAL DX SPEC: NORMAL
PATH REPORT.GROSS SPEC: NORMAL